# Patient Record
Sex: FEMALE | Race: BLACK OR AFRICAN AMERICAN
[De-identification: names, ages, dates, MRNs, and addresses within clinical notes are randomized per-mention and may not be internally consistent; named-entity substitution may affect disease eponyms.]

---

## 2018-02-01 ENCOUNTER — HOSPITAL ENCOUNTER (EMERGENCY)
Dept: HOSPITAL 12 - ER | Age: 28
Discharge: HOME | End: 2018-02-01
Payer: MEDICAID

## 2018-02-01 VITALS — WEIGHT: 285 LBS | HEIGHT: 67 IN | BODY MASS INDEX: 44.73 KG/M2

## 2018-02-01 DIAGNOSIS — I10: ICD-10-CM
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
## 2018-02-01 DIAGNOSIS — S53.402A: ICD-10-CM
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
## 2018-02-01 DIAGNOSIS — S63.502A: Primary | ICD-10-CM
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
## 2018-02-01 PROCEDURE — 73080 X-RAY EXAM OF ELBOW: CPT

## 2018-02-01 PROCEDURE — 29125 APPL SHORT ARM SPLINT STATIC: CPT

## 2018-02-01 PROCEDURE — 99284 EMERGENCY DEPT VISIT MOD MDM: CPT

## 2018-02-01 PROCEDURE — 73110 X-RAY EXAM OF WRIST: CPT

## 2018-02-01 PROCEDURE — A4663 DIALYSIS BLOOD PRESSURE CUFF: HCPCS

## 2018-02-01 PROCEDURE — 73130 X-RAY EXAM OF HAND: CPT

## 2018-02-02 ENCOUNTER — HOSPITAL ENCOUNTER (EMERGENCY)
Dept: HOSPITAL 12 - ER | Age: 28
Discharge: HOME | End: 2018-02-02
Payer: MEDICAID

## 2018-02-02 VITALS — BODY MASS INDEX: 44.73 KG/M2 | HEIGHT: 67 IN | WEIGHT: 285 LBS
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
## 2018-02-02 DIAGNOSIS — S59.912A: Primary | ICD-10-CM
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
## 2018-02-02 DIAGNOSIS — I10: ICD-10-CM
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
## 2018-02-02 PROCEDURE — 99282 EMERGENCY DEPT VISIT SF MDM: CPT

## 2018-02-02 PROCEDURE — A4663 DIALYSIS BLOOD PRESSURE CUFF: HCPCS

## 2018-02-02 NOTE — NUR
PATIENT WAS SEEN BY MD.  MEDS GIVEN AS ORDERED.  DC, RX INCLUDING PRECAUTIONS) 
GIVEN AND EXPLAINED TO PATIENT WHO STATE SHE UNDERSTANDS ALL INSTRUCTIONS.